# Patient Record
Sex: FEMALE | Race: BLACK OR AFRICAN AMERICAN | NOT HISPANIC OR LATINO | Employment: UNEMPLOYED | URBAN - METROPOLITAN AREA
[De-identification: names, ages, dates, MRNs, and addresses within clinical notes are randomized per-mention and may not be internally consistent; named-entity substitution may affect disease eponyms.]

---

## 2024-03-26 ENCOUNTER — OFFICE VISIT (OUTPATIENT)
Dept: URGENT CARE | Facility: CLINIC | Age: 19
End: 2024-03-26
Payer: COMMERCIAL

## 2024-03-26 VITALS
RESPIRATION RATE: 18 BRPM | TEMPERATURE: 96.2 F | OXYGEN SATURATION: 100 % | DIASTOLIC BLOOD PRESSURE: 60 MMHG | HEART RATE: 75 BPM | HEIGHT: 61 IN | SYSTOLIC BLOOD PRESSURE: 102 MMHG | WEIGHT: 106 LBS | BODY MASS INDEX: 20.01 KG/M2

## 2024-03-26 DIAGNOSIS — R42 DIZZINESS AND GIDDINESS: ICD-10-CM

## 2024-03-26 DIAGNOSIS — R52 BODY ACHES: Primary | ICD-10-CM

## 2024-03-26 PROCEDURE — 87636 SARSCOV2 & INF A&B AMP PRB: CPT | Performed by: PHYSICIAN ASSISTANT

## 2024-03-26 PROCEDURE — 99213 OFFICE O/P EST LOW 20 MIN: CPT | Performed by: PHYSICIAN ASSISTANT

## 2024-03-26 NOTE — PROGRESS NOTES
Assessment/Plan    Body aches [R52]  1. Body aches  Covid19 and INFLUENZA A/B PCR      2. Dizziness and giddiness          Possible viral illness.  Recommending patient go to the emergency room if she develops worsening symptoms or symptoms do not improve by tomorrow.     You were swabbed today for COVID/flu.  If positive you will be called by someone in the office.  Your symptoms may be due to a viral illness such as the flu.  You need to be evaluated by a PCP within the next week.  If your symptoms worsen or do not improve by tomorrow morning you need to be evaluated in the emergency room. The emergency room will be able to do a full workup.   Make sure to rest while off work tomorrow and stay hydrated. Your symptoms may also be from dehydration.     Subjective:     Patient ID: Xiomara Sadler is a 18 y.o. female.      Reason For Visit / Chief Complaint  Chief Complaint   Patient presents with    Fatigue    Dizziness     Was in a hot kitchen and felt dizzy and weak with L ear pain and occ. SOB and nausea. No HA, fever or URI s/s. Has PMH of anemia.          Xiomara is a 18 year old female presenting to the urgent care today with fatigue and dizziness starting today. Pt states she was working in a hot kitchen and felt dizzy and weak. She does not report LOC or recent head trauma. After the incident, she was able to sit down which helped relieve her symptoms. She has associated epigastric pain, L ear pain, SOB, nausea, headaches and slight abdominal discomfort. Denies fevers, URI symptoms, or changes in vision. She has not taken any medications today to help with her symptoms. The only meal she had today was lunch and she reports drinking a very minimal amount of water throughout the day. She notes a hx of anemia and previous stomach ulcers.       Past Medical History:   Diagnosis Date    Anemia     Asthma     Migraine        History reviewed. No pertinent surgical history.    History reviewed. No pertinent  "family history.    Review of Systems   Constitutional:  Negative for chills and fever.   HENT:  Positive for ear pain (L side). Negative for sore throat.    Eyes:  Negative for pain and visual disturbance.   Respiratory:  Negative for cough and shortness of breath.    Cardiovascular:  Negative for chest pain and palpitations.   Gastrointestinal:  Positive for abdominal pain (epigastric region) and nausea. Negative for vomiting.   Genitourinary:  Negative for dysuria and hematuria.   Musculoskeletal:  Negative for arthralgias and back pain.   Skin:  Negative for color change and rash.   Neurological:  Positive for dizziness, weakness, light-headedness and headaches. Negative for seizures and syncope.   All other systems reviewed and are negative.      Objective:    /60   Pulse 75   Temp (!) 96.2 °F (35.7 °C)   Resp 18   Ht 5' 0.5\" (1.537 m)   Wt 48.1 kg (106 lb)   LMP 03/05/2024 (Approximate)   SpO2 100%   BMI 20.36 kg/m²     Physical Exam  Constitutional:       General: She is not in acute distress.     Appearance: Normal appearance. She is normal weight. She is not ill-appearing, toxic-appearing or diaphoretic.   HENT:      Head: Normocephalic and atraumatic.      Right Ear: Tympanic membrane, ear canal and external ear normal. There is no impacted cerumen.      Left Ear: Tympanic membrane, ear canal and external ear normal. There is no impacted cerumen.      Nose: Nose normal. No congestion or rhinorrhea.      Mouth/Throat:      Mouth: Mucous membranes are moist.      Pharynx: Oropharynx is clear. No oropharyngeal exudate or posterior oropharyngeal erythema.   Eyes:      General: No scleral icterus.        Right eye: No discharge.         Left eye: No discharge.      Extraocular Movements: Extraocular movements intact.      Conjunctiva/sclera: Conjunctivae normal.      Pupils: Pupils are equal, round, and reactive to light.   Cardiovascular:      Rate and Rhythm: Normal rate and regular rhythm.      " Heart sounds: No murmur heard.     No friction rub. No gallop.   Pulmonary:      Effort: Pulmonary effort is normal. No respiratory distress.      Breath sounds: Normal breath sounds. No stridor. No wheezing, rhonchi or rales.   Chest:      Chest wall: No tenderness.   Abdominal:      General: Abdomen is flat. Bowel sounds are normal. There is no distension.      Palpations: Abdomen is soft. There is no mass.      Tenderness: There is no abdominal tenderness. There is no right CVA tenderness, left CVA tenderness, guarding or rebound.      Hernia: No hernia is present.   Musculoskeletal:         General: Normal range of motion.   Skin:     General: Skin is warm.      Capillary Refill: Capillary refill takes less than 2 seconds.   Neurological:      General: No focal deficit present.      Mental Status: She is alert and oriented to person, place, and time. Mental status is at baseline.      Cranial Nerves: No cranial nerve deficit.      Sensory: No sensory deficit.      Motor: No weakness.      Coordination: Coordination normal.      Gait: Gait normal.      Deep Tendon Reflexes: Reflexes normal.

## 2024-03-26 NOTE — PATIENT INSTRUCTIONS
You were swabbed today for COVID/flu.  If positive you will be called by someone in the office.  Your symptoms may be due to a viral illness such as the flu.  You need to be evaluated by a PCP within the next week.  If your symptoms worsen or do not improve by tomorrow morning you need to be evaluated in the emergency room. The emergency room will be able to do a full workup.   Make sure to rest while off work tomorrow and stay hydrated. Your symptoms may also be from dehydration.         Dizziness   WHAT YOU NEED TO KNOW:   Dizziness is a feeling of being off balance or unsteady. Common causes of dizziness are an inner ear fluid imbalance or a lack of oxygen in your blood. Dizziness may be acute (lasts 3 days or less) or chronic (lasts longer than 3 days). You may have dizzy spells that last from seconds to a few hours.   DISCHARGE INSTRUCTIONS:   Return to the emergency department if:   You have a headache and a stiff neck.    You have shaking chills and a fever.     You vomit over and over with no relief.     Your vomit or bowel movements are red or black.     You have pain in your chest, back, or abdomen.     You have numbness, especially in your face, arms, or legs.     You have trouble moving your arms or legs.     You are confused.    Contact your healthcare provider if:   You have a fever.     Your symptoms do not get better with treatment.     You have questions or concerns about your condition or care.    Manage your symptoms:   Do not drive  or operate heavy machinery when you are dizzy.     Get up slowly  from sitting or lying down.     Drink plenty of liquids.  Liquids help prevent dehydration. Ask how much liquid to drink each day and which liquids are best for you.    Follow up with your doctor as directed:  Write down your questions so you remember to ask them during your visits.  © Copyright [employer] 2023 Information is for End User's use only and may not be sold, redistributed or otherwise used for  commercial purposes.  The above information is an  only. It is not intended as medical advice for individual conditions or treatments. Talk to your doctor, nurse or pharmacist before following any medical regimen to see if it is safe and effective for you.

## 2024-03-27 ENCOUNTER — HOSPITAL ENCOUNTER (EMERGENCY)
Facility: HOSPITAL | Age: 19
Discharge: HOME | End: 2024-03-27
Attending: EMERGENCY MEDICINE
Payer: COMMERCIAL

## 2024-03-27 ENCOUNTER — APPOINTMENT (EMERGENCY)
Dept: RADIOLOGY | Facility: HOSPITAL | Age: 19
End: 2024-03-27
Payer: COMMERCIAL

## 2024-03-27 VITALS
OXYGEN SATURATION: 100 % | SYSTOLIC BLOOD PRESSURE: 106 MMHG | RESPIRATION RATE: 20 BRPM | TEMPERATURE: 97 F | DIASTOLIC BLOOD PRESSURE: 65 MMHG | HEART RATE: 82 BPM

## 2024-03-27 DIAGNOSIS — R07.89 CHEST PRESSURE: ICD-10-CM

## 2024-03-27 DIAGNOSIS — M94.0 COSTOCHONDRITIS: Primary | ICD-10-CM

## 2024-03-27 LAB
ALBUMIN SERPL BCP-MCNC: 4.4 G/DL (ref 3.5–5)
ALP SERPL-CCNC: 45 U/L (ref 34–104)
ALT SERPL W P-5'-P-CCNC: 15 U/L (ref 7–52)
ANION GAP SERPL CALCULATED.3IONS-SCNC: 7 MMOL/L (ref 4–13)
AST SERPL W P-5'-P-CCNC: 21 U/L (ref 13–39)
BASOPHILS # BLD AUTO: 0.02 THOUSANDS/ÂΜL (ref 0–0.1)
BASOPHILS NFR BLD AUTO: 0 % (ref 0–1)
BILIRUB SERPL-MCNC: 0.49 MG/DL (ref 0.2–1)
BUN SERPL-MCNC: 11 MG/DL (ref 5–25)
CALCIUM SERPL-MCNC: 9.7 MG/DL (ref 8.4–10.2)
CARDIAC TROPONIN I PNL SERPL HS: <2 NG/L
CHLORIDE SERPL-SCNC: 105 MMOL/L (ref 96–108)
CO2 SERPL-SCNC: 25 MMOL/L (ref 21–32)
CREAT SERPL-MCNC: 0.73 MG/DL (ref 0.6–1.3)
EOSINOPHIL # BLD AUTO: 0.11 THOUSAND/ÂΜL (ref 0–0.61)
EOSINOPHIL NFR BLD AUTO: 2 % (ref 0–6)
ERYTHROCYTE [DISTWIDTH] IN BLOOD BY AUTOMATED COUNT: 12.4 % (ref 11.6–15.1)
EXT PREGNANCY TEST URINE: NEGATIVE
EXT. CONTROL: NORMAL
FLUAV RNA RESP QL NAA+PROBE: NEGATIVE
FLUBV RNA RESP QL NAA+PROBE: NEGATIVE
GFR SERPL CREATININE-BSD FRML MDRD: 118 ML/MIN/1.73SQ M
GLUCOSE SERPL-MCNC: 78 MG/DL (ref 65–140)
GLUCOSE SERPL-MCNC: 84 MG/DL (ref 65–140)
HCT VFR BLD AUTO: 41.5 % (ref 34.8–46.1)
HGB BLD-MCNC: 13.6 G/DL (ref 11.5–15.4)
IMM GRANULOCYTES # BLD AUTO: 0 THOUSAND/UL (ref 0–0.2)
IMM GRANULOCYTES NFR BLD AUTO: 0 % (ref 0–2)
LIPASE SERPL-CCNC: <6 U/L (ref 11–82)
LYMPHOCYTES # BLD AUTO: 2.42 THOUSANDS/ÂΜL (ref 0.6–4.47)
LYMPHOCYTES NFR BLD AUTO: 41 % (ref 14–44)
MCH RBC QN AUTO: 30.6 PG (ref 26.8–34.3)
MCHC RBC AUTO-ENTMCNC: 32.8 G/DL (ref 31.4–37.4)
MCV RBC AUTO: 93 FL (ref 82–98)
MONOCYTES # BLD AUTO: 0.51 THOUSAND/ÂΜL (ref 0.17–1.22)
MONOCYTES NFR BLD AUTO: 9 % (ref 4–12)
NEUTROPHILS # BLD AUTO: 2.85 THOUSANDS/ÂΜL (ref 1.85–7.62)
NEUTS SEG NFR BLD AUTO: 48 % (ref 43–75)
NRBC BLD AUTO-RTO: 0 /100 WBCS
PLATELET # BLD AUTO: 197 THOUSANDS/UL (ref 149–390)
PMV BLD AUTO: 11.3 FL (ref 8.9–12.7)
POTASSIUM SERPL-SCNC: 4 MMOL/L (ref 3.5–5.3)
PROT SERPL-MCNC: 7.5 G/DL (ref 6.4–8.4)
RBC # BLD AUTO: 4.45 MILLION/UL (ref 3.81–5.12)
SARS-COV-2 RNA RESP QL NAA+PROBE: NEGATIVE
SODIUM SERPL-SCNC: 137 MMOL/L (ref 135–147)
WBC # BLD AUTO: 5.91 THOUSAND/UL (ref 4.31–10.16)

## 2024-03-27 PROCEDURE — 84484 ASSAY OF TROPONIN QUANT: CPT | Performed by: EMERGENCY MEDICINE

## 2024-03-27 PROCEDURE — 85025 COMPLETE CBC W/AUTO DIFF WBC: CPT | Performed by: EMERGENCY MEDICINE

## 2024-03-27 PROCEDURE — 71046 X-RAY EXAM CHEST 2 VIEWS: CPT

## 2024-03-27 PROCEDURE — 83690 ASSAY OF LIPASE: CPT | Performed by: EMERGENCY MEDICINE

## 2024-03-27 PROCEDURE — 96374 THER/PROPH/DIAG INJ IV PUSH: CPT

## 2024-03-27 PROCEDURE — 99285 EMERGENCY DEPT VISIT HI MDM: CPT

## 2024-03-27 PROCEDURE — 96361 HYDRATE IV INFUSION ADD-ON: CPT

## 2024-03-27 PROCEDURE — 36415 COLL VENOUS BLD VENIPUNCTURE: CPT | Performed by: EMERGENCY MEDICINE

## 2024-03-27 PROCEDURE — 82948 REAGENT STRIP/BLOOD GLUCOSE: CPT

## 2024-03-27 PROCEDURE — 81025 URINE PREGNANCY TEST: CPT | Performed by: EMERGENCY MEDICINE

## 2024-03-27 PROCEDURE — 80053 COMPREHEN METABOLIC PANEL: CPT | Performed by: EMERGENCY MEDICINE

## 2024-03-27 PROCEDURE — 93005 ELECTROCARDIOGRAM TRACING: CPT

## 2024-03-27 PROCEDURE — 99285 EMERGENCY DEPT VISIT HI MDM: CPT | Performed by: EMERGENCY MEDICINE

## 2024-03-27 RX ORDER — KETOROLAC TROMETHAMINE 30 MG/ML
15 INJECTION, SOLUTION INTRAMUSCULAR; INTRAVENOUS ONCE
Status: COMPLETED | OUTPATIENT
Start: 2024-03-27 | End: 2024-03-27

## 2024-03-27 RX ADMIN — SODIUM CHLORIDE 1000 ML: 0.9 INJECTION, SOLUTION INTRAVENOUS at 13:45

## 2024-03-27 RX ADMIN — KETOROLAC TROMETHAMINE 15 MG: 30 INJECTION, SOLUTION INTRAMUSCULAR; INTRAVENOUS at 13:52

## 2024-03-27 NOTE — ED PROVIDER NOTES
History  Chief Complaint   Patient presents with    Chest Pain     Chest discomfort dizziness and weakness since yesterday, did  go to urgent care yesterday, swabbed neg for covid and flu. Went to another urgent care today that did an EKG, was told it was abnormal     Pt is a 17yo F who presents for chest pain.  Patient's aunt reports that for the past several months she intermittently complains of a lower chest pain.  Aunt states that she thought it was ulcers or gastritis and was therefore giving her Tums and having her adjust her diet.  She was doing well with those changes.  Yesterday, however, patient was at work when she began to feel chest pressure, shortness of breath, lightheadedness, and near syncope.  Patient did not pass out.  Patient reports she felt nauseous but did not vomit.  Patient reports that she was seen at urgent care at that time.  They did a viral swab that was negative and she was told to go to the emergency department with worsening or not resolving symptoms.  Patient states she has not had any further episodes of the lightheadedness or the near syncope but has still felt the chest pain.  She describes it as a pressure.  Aunt encouraged patient to go to a different urgent care this morning and patient had an EKG done.  Patient reported to triage nurse that the EKG was abnormal and she was recommended to come to the ED.  Aunt also reports that patient has history of headaches.  Patient reports her headache is currently a 2 out of 10.  She reports her chest pain is currently a 6 out of 10.  She reports her chest pain is not pleuritic.  Patient has not had any recent viral illnesses, however 2 to 3 weeks ago multiple people in her household had the flu.  Patient has remote history of asthma but does not take any daily medications.  Patient is otherwise healthy.    Pt is Japanese Creole speaking only and opted for aunt to translate.          Prior to Admission Medications   Prescriptions Last  Dose Informant Patient Reported? Taking?   UNKNOWN TO PATIENT Not Taking  Yes No   Sig: daily as needed Medication for migraines - unknown medication   Patient not taking: Reported on 3/27/2024      Facility-Administered Medications: None       Past Medical History:   Diagnosis Date    Anemia     Asthma     Migraine        History reviewed. No pertinent surgical history.    History reviewed. No pertinent family history.  I have reviewed and agree with the history as documented.    E-Cigarette/Vaping    E-Cigarette Use Never User      E-Cigarette/Vaping Substances     Social History     Tobacco Use    Smoking status: Never   Vaping Use    Vaping status: Never Used   Substance Use Topics    Alcohol use: Never    Drug use: Never       Review of Systems   Respiratory:  Positive for shortness of breath.    Cardiovascular:  Positive for chest pain.   Gastrointestinal:  Positive for nausea (resolved).   Neurological:  Positive for syncope (near syncope yesterday) and headaches (chronic; mild currently).       Physical Exam  Physical Exam  Vitals reviewed.   Constitutional:       General: She is not in acute distress.     Appearance: She is well-developed. She is not toxic-appearing or diaphoretic.   HENT:      Head: Normocephalic and atraumatic.      Right Ear: External ear normal.      Left Ear: External ear normal.      Nose: Nose normal.      Mouth/Throat:      Pharynx: Oropharynx is clear.   Eyes:      Extraocular Movements: Extraocular movements intact.      Pupils: Pupils are equal, round, and reactive to light.   Cardiovascular:      Rate and Rhythm: Normal rate and regular rhythm.      Heart sounds: Normal heart sounds.   Pulmonary:      Effort: Pulmonary effort is normal. No respiratory distress.      Breath sounds: Normal breath sounds. No wheezing.   Chest:      Chest wall: Tenderness (bilateral along the costosternal border) present.   Abdominal:      General: Bowel sounds are normal. There is no distension.       Palpations: Abdomen is soft.      Tenderness: There is no abdominal tenderness.   Musculoskeletal:         General: Normal range of motion.      Cervical back: Normal range of motion and neck supple.      Right lower leg: No edema.      Left lower leg: No edema.   Skin:     General: Skin is warm and dry.      Capillary Refill: Capillary refill takes less than 2 seconds.      Coloration: Skin is not pale.      Findings: No erythema or rash.   Neurological:      General: No focal deficit present.      Mental Status: She is alert and oriented to person, place, and time.   Psychiatric:         Speech: Speech normal.         Behavior: Behavior is cooperative.         Vital Signs  ED Triage Vitals [03/27/24 1306]   Temperature Pulse Respirations Blood Pressure SpO2   (!) 97 °F (36.1 °C) 72 20 118/75 100 %      Temp Source Heart Rate Source Patient Position - Orthostatic VS BP Location FiO2 (%)   Tympanic Monitor Sitting Right arm --      Pain Score       6           Vitals:    03/27/24 1306 03/27/24 1345 03/27/24 1415 03/27/24 1500   BP: 118/75 126/89 121/81 106/65   Pulse: 72 66 62 82   Patient Position - Orthostatic VS: Sitting  Lying Sitting         Visual Acuity      ED Medications  Medications   sodium chloride 0.9 % bolus 1,000 mL (0 mL Intravenous Stopped 3/27/24 1517)   ketorolac (TORADOL) injection 15 mg (15 mg Intravenous Given 3/27/24 1352)       Diagnostic Studies  Results Reviewed       Procedure Component Value Units Date/Time    Comprehensive metabolic panel [[id number]] Collected: 03/27/24 1341    Lab Status: Final result Specimen: Blood from Arm, Left Updated: 03/27/24 1421     Sodium 137 mmol/L      Potassium 4.0 mmol/L      Chloride 105 mmol/L      CO2 25 mmol/L      ANION GAP 7 mmol/L      BUN 11 mg/dL      Creatinine 0.73 mg/dL      Glucose 84 mg/dL      Calcium 9.7 mg/dL      AST 21 U/L      ALT 15 U/L      Alkaline Phosphatase 45 U/L      Total Protein 7.5 g/dL      Albumin 4.4 g/dL      Total  Bilirubin 0.49 mg/dL      eGFR 118 ml/min/1.73sq m     Narrative:      National Kidney Disease Foundation guidelines for Chronic Kidney Disease (CKD):     Stage 1 with normal or high GFR (GFR > 90 mL/min/1.73 square meters)    Stage 2 Mild CKD (GFR = 60-89 mL/min/1.73 square meters)    Stage 3A Moderate CKD (GFR = 45-59 mL/min/1.73 square meters)    Stage 3B Moderate CKD (GFR = 30-44 mL/min/1.73 square meters)    Stage 4 Severe CKD (GFR = 15-29 mL/min/1.73 square meters)    Stage 5 End Stage CKD (GFR <15 mL/min/1.73 square meters)  Note: GFR calculation is accurate only with a steady state creatinine    Lipase [[id number]]  (Abnormal) Collected: 03/27/24 1341    Lab Status: Final result Specimen: Blood from Arm, Left Updated: 03/27/24 1421     Lipase <6 u/L     HS Troponin 0hr (reflex protocol) [[id number]]  (Normal) Collected: 03/27/24 1341    Lab Status: Final result Specimen: Blood from Arm, Left Updated: 03/27/24 1414     hs TnI 0hr <2 ng/L     CBC and differential [[id number]] Collected: 03/27/24 1341    Lab Status: Final result Specimen: Blood from Arm, Left Updated: 03/27/24 1353     WBC 5.91 Thousand/uL      RBC 4.45 Million/uL      Hemoglobin 13.6 g/dL      Hematocrit 41.5 %      MCV 93 fL      MCH 30.6 pg      MCHC 32.8 g/dL      RDW 12.4 %      MPV 11.3 fL      Platelets 197 Thousands/uL      nRBC 0 /100 WBCs      Neutrophils Relative 48 %      Immature Grans % 0 %      Lymphocytes Relative 41 %      Monocytes Relative 9 %      Eosinophils Relative 2 %      Basophils Relative 0 %      Neutrophils Absolute 2.85 Thousands/µL      Absolute Immature Grans 0.00 Thousand/uL      Absolute Lymphocytes 2.42 Thousands/µL      Absolute Monocytes 0.51 Thousand/µL      Eosinophils Absolute 0.11 Thousand/µL      Basophils Absolute 0.02 Thousands/µL     POCT pregnancy, urine [[id number]]  (Normal) Resulted: 03/27/24 1346    Lab Status: Final result Updated: 03/27/24 1347     EXT Preg Test, Ur Negative     Control Valid     Fingerstick Glucose (POCT) [[id number]]  (Normal) Collected: 03/27/24 1335    Lab Status: Final result Specimen: Blood Updated: 03/27/24 1336     POC Glucose 78 mg/dl                    XR chest 2 views    (Results Pending)              Procedures  Procedures         ED Course  ED Course as of 03/27/24 1525   Wed Mar 27, 2024   1321 Procedure Note: EKG  Date/Time: 03/27/24 2:33 PM   Interpreted by: Bonnie Sharma MD  Indications / Diagnosis: CP  ECG reviewed by me, the ED Physician: yes   The EKG demonstrates:  Rhythm: normal sinus  Intervals: normal intervals  Axis: normal axis  QRS/Blocks: normal QRS  ST Changes: No acute ST Changes, no STD/NICO.   1336 POC Glucose: 78  WNL   1350 PREGNANCY TEST URINE: Negative  Negative.    1355 CBC and differential  Reviewed and without actionable derangement.    1414 hs TnI 0hr: <2  WNL. Does not require delta based on timing or level. Effectively rules out ACS and myocarditis.   1432 Comprehensive metabolic panel  Reviewed and without actionable derangement.    1432 LIPASE(!): <6  Negative.    1432 XR chest 2 views  No acute consolidation, effusion, or pneumothorax on wet read.    1513 Pt reassessed and resting comfortably. VSS. Pt made aware of all results via  as aunt no longer at bedside. Pt expressed understanding and is agreeable to plan.              HEART Risk Score      Flowsheet Row Most Recent Value   Heart Score Risk Calculator    History 0 Filed at: 03/27/2024 1506   ECG 0 Filed at: 03/27/2024 1506   Age 0 Filed at: 03/27/2024 1506   Risk Factors 0 Filed at: 03/27/2024 1506   Troponin 0 Filed at: 03/27/2024 1506   HEART Score 0 Filed at: 03/27/2024 1506                  PERC Rule for PE      Flowsheet Row Most Recent Value   PERC Rule for PE    Age >=50 0 Filed at: 03/27/2024 1330   HR >=100 0 Filed at: 03/27/2024 1330   O2 Sat on room air < 95% 0 Filed at: 03/27/2024 1330   History of PE or DVT 0 Filed at: 03/27/2024 1330   Recent trauma or surgery 0  Filed at: 03/27/2024 1330   Hemoptysis 0 Filed at: 03/27/2024 1330   Exogenous estrogen 0 Filed at: 03/27/2024 1330   Unilateral leg swelling 0 Filed at: 03/27/2024 1330   PERC Rule for PE Results 0 Filed at: 03/27/2024 1330                    Wells' Criteria for PE      Flowsheet Row Most Recent Value   Wells' Criteria for PE    Clinical signs and symptoms of DVT 0 Filed at: 03/27/2024 1330   PE is primary diagnosis or equally likely 0 Filed at: 03/27/2024 1330   HR >100 0 Filed at: 03/27/2024 1330   Immobilization at least 3 days or Surgery in the previous 4 weeks 0 Filed at: 03/27/2024 1330   Previous, objectively diagnosed PE or DVT 0 Filed at: 03/27/2024 1330   Hemoptysis 0 Filed at: 03/27/2024 1330   Malignancy with treatment within 6 months or palliative 0 Filed at: 03/27/2024 1330   Wells' Criteria Total 0 Filed at: 03/27/2024 1330                  Medical Decision Making  Pt is a 17yo F who presents with chest pain. Exam pertinent for costosternal tenderness.    Differential diagnosis to include but not limited to costochondritis, myocarditis, pericarditis, arrhythmia, anemia, electrolyte abnormality, pregnancy, dehydration, PE.  Will plan for labs, chest x-ray, and EKG.  Will treat symptomatically and reassess.  Patient is Wells low and PERCs out and therefore does not require further w/u for PE, see ED course for results and details.    Plan to discharge pt with f/u to PCP. Discussed returning the ED with new or worsening of symptoms. Discussed use of over the counter medications as stated on the bottle as needed for pain. Pt expressed understanding of discharge instructions, return precautions, and medication instructions and is stable for discharge at this time. All questions were answered and pt was discharged without incident.       Amount and/or Complexity of Data Reviewed  Labs: ordered. Decision-making details documented in ED Course.  Radiology: ordered. Decision-making details documented in ED  Course.    Risk  Prescription drug management.             Disposition  Final diagnoses:   Chest pressure   Costochondritis     Time reflects when diagnosis was documented in both MDM as applicable and the Disposition within this note       Time User Action Codes Description Comment    3/27/2024  3:06 PM Bonnie Sharma Add [R07.89] Chest pressure     3/27/2024  3:06 PM Bonnie Sharma Add [M94.0] Costochondritis     3/27/2024  3:07 PM Bonnie Sharma Modify [R07.89] Chest pressure     3/27/2024  3:07 PM Bonnie Sharma Modify [M94.0] Costochondritis           ED Disposition       ED Disposition   Discharge    Condition   Stable    Date/Time   Wed Mar 27, 2024 1452    Comment   Xiomara Sadler discharge to home/self care.                   Follow-up Information       Follow up With Specialties Details Why Contact Info Additional Information    [facility]  Call  As needed [phone]       [facility] Call  As needed [address] [facility] [address] [phone] [facility], [address] [place], [place], [address] [phone]            Patient's Medications   Discharge Prescriptions    No medications on file       No discharge procedures on file.    PDMP Review       None            ED Provider  Electronically Signed by             Bonnei Sharma MD  03/27/24 1487

## 2024-03-27 NOTE — DISCHARGE INSTRUCTIONS
Follow-up with primary care for further care. Contact info provided below if needed.  Contact info also provided for neurology if you would like to follow up for your headaches.   Use over the counter medications as stated on the bottle as needed for pain control.  Stay hydrated.   Return to the ED with new or worsening symptoms.

## 2024-03-28 LAB
ATRIAL RATE: 62 BPM
P AXIS: 72 DEGREES
PR INTERVAL: 140 MS
QRS AXIS: 62 DEGREES
QRSD INTERVAL: 70 MS
QT INTERVAL: 368 MS
QTC INTERVAL: 373 MS
T WAVE AXIS: 39 DEGREES
VENTRICULAR RATE: 62 BPM

## 2024-03-28 PROCEDURE — 93010 ELECTROCARDIOGRAM REPORT: CPT | Performed by: INTERNAL MEDICINE

## 2024-05-13 ENCOUNTER — HOSPITAL ENCOUNTER (EMERGENCY)
Facility: HOSPITAL | Age: 19
Discharge: HOME | End: 2024-05-13
Attending: STUDENT IN AN ORGANIZED HEALTH CARE EDUCATION/TRAINING PROGRAM
Payer: COMMERCIAL

## 2024-05-13 VITALS
DIASTOLIC BLOOD PRESSURE: 78 MMHG | WEIGHT: 103.8 LBS | RESPIRATION RATE: 18 BRPM | HEART RATE: 74 BPM | TEMPERATURE: 97.9 F | BODY MASS INDEX: 19.94 KG/M2 | SYSTOLIC BLOOD PRESSURE: 109 MMHG | OXYGEN SATURATION: 99 %

## 2024-05-13 DIAGNOSIS — K08.89 PAIN, DENTAL: ICD-10-CM

## 2024-05-13 DIAGNOSIS — H92.02 EAR PAIN, LEFT: Primary | ICD-10-CM

## 2024-05-13 PROCEDURE — 99284 EMERGENCY DEPT VISIT MOD MDM: CPT | Performed by: STUDENT IN AN ORGANIZED HEALTH CARE EDUCATION/TRAINING PROGRAM

## 2024-05-13 PROCEDURE — 99282 EMERGENCY DEPT VISIT SF MDM: CPT

## 2024-05-13 RX ORDER — CIPROFLOXACIN AND DEXAMETHASONE 3; 1 MG/ML; MG/ML
4 SUSPENSION/ DROPS AURICULAR (OTIC) 2 TIMES DAILY
Qty: 3 ML | Refills: 0 | Status: SHIPPED | OUTPATIENT
Start: 2024-05-13 | End: 2024-05-20

## 2024-05-13 NOTE — ED PROVIDER NOTES
"History  Chief Complaint   Patient presents with    Ear Problem     States \" feels like something is walking in my ear \". Mild pain left ear     Dental Pain     Shows broken left upper molar . No dentist. Pain if eats \" too sweet \" food      Patient is a 19-year-old female, past medical history including asthma, who presents to the emergency room for left upper dental pain as well as left ear pain.  Both problems have been present for years.  She states that it feels like there is something crawling in her left ear and it is causing her pain.  The dental pain is really only when she eats cold foods.  She states it is at the area of a broken tooth that has been present for a while.  Has not seen a dentist, nor an ENT doctor.  She has no other complaints or concerns.        Prior to Admission Medications   Prescriptions Last Dose Informant Patient Reported? Taking?   UNKNOWN TO PATIENT   Yes No   Sig: daily as needed Medication for migraines - unknown medication   Patient not taking: Reported on 3/27/2024      Facility-Administered Medications: None       Past Medical History:   Diagnosis Date    Anemia     Asthma     Migraine        History reviewed. No pertinent surgical history.    History reviewed. No pertinent family history.  I have reviewed and agree with the history as documented.    E-Cigarette/Vaping    E-Cigarette Use Never User      E-Cigarette/Vaping Substances     Social History     Tobacco Use    Smoking status: Never    Smokeless tobacco: Never   Vaping Use    Vaping status: Never Used   Substance Use Topics    Alcohol use: Never    Drug use: Never       Review of Systems   HENT:  Positive for dental problem and ear pain.    All other systems reviewed and are negative.      Physical Exam  Physical Exam  Vitals and nursing note reviewed.   Constitutional:       General: She is not in acute distress.     Appearance: She is well-developed. She is not ill-appearing, toxic-appearing or diaphoretic.   HENT: "      Head: Normocephalic and atraumatic.      Right Ear: Tympanic membrane, ear canal and external ear normal.      Left Ear: Tympanic membrane and external ear normal.      Ears:      Comments: Mild erythema of the left external auditory canal.  No foreign bodies.  TM is unremarkable.     Nose: Nose normal.      Mouth/Throat:     Eyes:      General: Lids are normal. No scleral icterus.  Cardiovascular:      Rate and Rhythm: Normal rate and regular rhythm.   Pulmonary:      Effort: Pulmonary effort is normal. No respiratory distress.   Musculoskeletal:         General: No deformity. Normal range of motion.      Cervical back: Normal range of motion and neck supple.   Skin:     General: Skin is warm and dry.   Neurological:      General: No focal deficit present.      Mental Status: She is alert.   Psychiatric:         Mood and Affect: Mood normal.         Behavior: Behavior normal.         Vital Signs  ED Triage Vitals [05/13/24 1402]   Temperature Pulse Respirations Blood Pressure SpO2   97.9 °F (36.6 °C) 74 18 109/78 99 %      Temp Source Heart Rate Source Patient Position - Orthostatic VS BP Location FiO2 (%)   Tympanic Monitor Sitting Left arm --      Pain Score       3           Vitals:    05/13/24 1402   BP: 109/78   Pulse: 74   Patient Position - Orthostatic VS: Sitting         Visual Acuity      ED Medications  Medications - No data to display    Diagnostic Studies  Results Reviewed       None                   No orders to display              Procedures  Procedures         ED Course                               SBIRT 20yo+      Flowsheet Row Most Recent Value   Initial Alcohol Screen: US AUDIT-C     1. How often do you have a drink containing alcohol? 0 Filed at: 05/13/2024 1405   Audit-C Score 0 Filed at: 05/13/2024 1405   RADHA: How many times in the past year have you...    Used an illegal drug or used a prescription medication for non-medical reasons? Never Filed at: 05/13/2024 1402       "                Medical Decision Making  Patient is a 19 y.o. female who presents to the ED for left ear pain, dental pain. Pt is non-toxic, well appearing. Vitals are stable. On exam there is a fractured tooth and mild left EAC erythema.     Differential includes but is not limited to: Irritation, mild otitis externa.     Cold sensitivity likely secondary to fractured tooth.  Presentation not consistent with abscess, ludwigs, pta, rpa.     Plan: reassurance, abx ear drops, d/c with ENT and dental f/u                 Portions of the record may have been created with voice recognition software. Occasional wrong word or \"sound a like\" substitutions may have occurred due to the inherent limitations of voice recognition software. Read the chart carefully and recognize, using context, where substitutions have occurred.    Problems Addressed:  Ear pain, left: acute illness or injury  Pain, dental: acute illness or injury    Risk  Prescription drug management.             Disposition  Final diagnoses:   Ear pain, left   Pain, dental     Time reflects when diagnosis was documented in both MDM as applicable and the Disposition within this note       Time User Action Codes Description Comment    5/13/2024  2:28 PM Florentin Ponce Add [H92.02] Ear pain, left     5/13/2024  2:28 PM Florentin Ponce Add [K08.89] Pain, dental           ED Disposition       ED Disposition   Discharge    Condition   Stable    Date/Time   Mon May 13, 2024 1428    Comment   Xiomara Sadler discharge to home/self care.                   Follow-up Information       Follow up With Specialties Details Why Contact Info Additional Information    Infolink  Call in 1 day  [phone]       [facility] Emergency Medicine   [address] [place] [id number] [phone] [facility]ly [facility] Emergency Department, [address] [place] [address], [address]    [facility]  " Schedule an appointment as soon as possible for a visit   [address]  [facility] [address]  [phone]     [facility]   [address] [place] [address] [phone] [facility] [place], [address] [place] [address], [facility], [place], [address], [phone]            Discharge Medication List as of 5/13/2024  2:30 PM        START taking these medications    Details   ciprofloxacin-dexamethasone (CIPRODEX) otic suspension Administer 4 drops into the left ear 2 (two) times a day for 7 days, Starting Mon 5/13/2024, Until Mon 5/20/2024, Normal           CONTINUE these medications which have NOT CHANGED    Details   UNKNOWN TO PATIENT daily as needed Medication for migraines - unknown medication, Historical Med             No discharge procedures on file.    PDMP Review       None            ED Provider  Electronically Signed by             Florentin Ponce DO  05/13/24 6321

## 2024-09-09 ENCOUNTER — HOSPITAL ENCOUNTER (EMERGENCY)
Facility: HOSPITAL | Age: 19
Discharge: HOME | End: 2024-09-09
Attending: STUDENT IN AN ORGANIZED HEALTH CARE EDUCATION/TRAINING PROGRAM | Admitting: STUDENT IN AN ORGANIZED HEALTH CARE EDUCATION/TRAINING PROGRAM
Payer: COMMERCIAL

## 2024-09-09 ENCOUNTER — APPOINTMENT (EMERGENCY)
Dept: RADIOLOGY | Facility: HOSPITAL | Age: 19
End: 2024-09-09
Payer: COMMERCIAL

## 2024-09-09 VITALS
HEIGHT: 60 IN | DIASTOLIC BLOOD PRESSURE: 65 MMHG | HEART RATE: 82 BPM | RESPIRATION RATE: 16 BRPM | WEIGHT: 103 LBS | BODY MASS INDEX: 20.22 KG/M2 | SYSTOLIC BLOOD PRESSURE: 107 MMHG | TEMPERATURE: 98.8 F | OXYGEN SATURATION: 95 %

## 2024-09-09 DIAGNOSIS — R55 SYNCOPE: Primary | ICD-10-CM

## 2024-09-09 LAB
2HR DELTA HS TROPONIN: -4 NG/L
ALBUMIN SERPL BCG-MCNC: 5.2 G/DL (ref 3.5–5)
ALP SERPL-CCNC: 55 U/L (ref 34–104)
ALT SERPL W P-5'-P-CCNC: 18 U/L (ref 7–52)
ANION GAP SERPL CALCULATED.3IONS-SCNC: 12 MMOL/L (ref 4–13)
AST SERPL W P-5'-P-CCNC: 25 U/L (ref 13–39)
BASOPHILS # BLD AUTO: 0.04 THOUSANDS/ÂΜL (ref 0–0.1)
BASOPHILS NFR BLD AUTO: 0 % (ref 0–1)
BILIRUB SERPL-MCNC: 0.73 MG/DL (ref 0.2–1)
BUN SERPL-MCNC: 11 MG/DL (ref 5–25)
CALCIUM SERPL-MCNC: 10.2 MG/DL (ref 8.4–10.2)
CARDIAC TROPONIN I PNL SERPL HS: 11 NG/L
CARDIAC TROPONIN I PNL SERPL HS: 7 NG/L
CHLORIDE SERPL-SCNC: 100 MMOL/L (ref 96–108)
CO2 SERPL-SCNC: 27 MMOL/L (ref 21–32)
CREAT SERPL-MCNC: 0.7 MG/DL (ref 0.6–1.3)
D DIMER PPP FEU-MCNC: 0.4 UG/ML FEU
EOSINOPHIL # BLD AUTO: 0.04 THOUSAND/ÂΜL (ref 0–0.61)
EOSINOPHIL NFR BLD AUTO: 0 % (ref 0–6)
ERYTHROCYTE [DISTWIDTH] IN BLOOD BY AUTOMATED COUNT: 12.4 % (ref 11.6–15.1)
GFR SERPL CREATININE-BSD FRML MDRD: 124 ML/MIN/1.73SQ M
GLUCOSE SERPL-MCNC: 69 MG/DL (ref 65–140)
HCG SERPL QL: NEGATIVE
HCT VFR BLD AUTO: 44.2 % (ref 34.8–46.1)
HGB BLD-MCNC: 14.5 G/DL (ref 11.5–15.4)
IMM GRANULOCYTES # BLD AUTO: 0.01 THOUSAND/UL (ref 0–0.2)
IMM GRANULOCYTES NFR BLD AUTO: 0 % (ref 0–2)
LYMPHOCYTES # BLD AUTO: 1.95 THOUSANDS/ÂΜL (ref 0.6–4.47)
LYMPHOCYTES NFR BLD AUTO: 19 % (ref 14–44)
MCH RBC QN AUTO: 30.9 PG (ref 26.8–34.3)
MCHC RBC AUTO-ENTMCNC: 32.8 G/DL (ref 31.4–37.4)
MCV RBC AUTO: 94 FL (ref 82–98)
MONOCYTES # BLD AUTO: 0.66 THOUSAND/ÂΜL (ref 0.17–1.22)
MONOCYTES NFR BLD AUTO: 6 % (ref 4–12)
NEUTROPHILS # BLD AUTO: 7.54 THOUSANDS/ÂΜL (ref 1.85–7.62)
NEUTS SEG NFR BLD AUTO: 75 % (ref 43–75)
NRBC BLD AUTO-RTO: 0 /100 WBCS
PLATELET # BLD AUTO: 246 THOUSANDS/UL (ref 149–390)
PMV BLD AUTO: 11.1 FL (ref 8.9–12.7)
POTASSIUM SERPL-SCNC: 3.5 MMOL/L (ref 3.5–5.3)
PROT SERPL-MCNC: 8 G/DL (ref 6.4–8.4)
RBC # BLD AUTO: 4.7 MILLION/UL (ref 3.81–5.12)
SODIUM SERPL-SCNC: 139 MMOL/L (ref 135–147)
WBC # BLD AUTO: 10.24 THOUSAND/UL (ref 4.31–10.16)

## 2024-09-09 PROCEDURE — 93005 ELECTROCARDIOGRAM TRACING: CPT

## 2024-09-09 PROCEDURE — 36415 COLL VENOUS BLD VENIPUNCTURE: CPT | Performed by: STUDENT IN AN ORGANIZED HEALTH CARE EDUCATION/TRAINING PROGRAM

## 2024-09-09 PROCEDURE — 71045 X-RAY EXAM CHEST 1 VIEW: CPT

## 2024-09-09 PROCEDURE — 85025 COMPLETE CBC W/AUTO DIFF WBC: CPT | Performed by: STUDENT IN AN ORGANIZED HEALTH CARE EDUCATION/TRAINING PROGRAM

## 2024-09-09 PROCEDURE — 84703 CHORIONIC GONADOTROPIN ASSAY: CPT | Performed by: STUDENT IN AN ORGANIZED HEALTH CARE EDUCATION/TRAINING PROGRAM

## 2024-09-09 PROCEDURE — 84484 ASSAY OF TROPONIN QUANT: CPT | Performed by: STUDENT IN AN ORGANIZED HEALTH CARE EDUCATION/TRAINING PROGRAM

## 2024-09-09 PROCEDURE — 80053 COMPREHEN METABOLIC PANEL: CPT | Performed by: STUDENT IN AN ORGANIZED HEALTH CARE EDUCATION/TRAINING PROGRAM

## 2024-09-09 PROCEDURE — 99284 EMERGENCY DEPT VISIT MOD MDM: CPT | Performed by: STUDENT IN AN ORGANIZED HEALTH CARE EDUCATION/TRAINING PROGRAM

## 2024-09-09 PROCEDURE — 96360 HYDRATION IV INFUSION INIT: CPT

## 2024-09-09 PROCEDURE — 99284 EMERGENCY DEPT VISIT MOD MDM: CPT

## 2024-09-09 PROCEDURE — 96361 HYDRATE IV INFUSION ADD-ON: CPT

## 2024-09-09 PROCEDURE — 85379 FIBRIN DEGRADATION QUANT: CPT | Performed by: STUDENT IN AN ORGANIZED HEALTH CARE EDUCATION/TRAINING PROGRAM

## 2024-09-09 RX ADMIN — SODIUM CHLORIDE 1000 ML: 0.9 INJECTION, SOLUTION INTRAVENOUS at 19:47

## 2024-09-10 NOTE — ED PROVIDER NOTES
History  Chief Complaint   Patient presents with    Syncope     Patient was walking to work when she stopped at a bank asking for help, then had a syncopal episode,is awake upon arrival     Patient is a 19-year-old female, past medical history including asthma, who presents the emergency room after syncopal episode.  Patient was walking at work when she began to feel lightheaded.  She went inside a bank and then passed out.  She was lowered to the ground and did not experience any trauma.  When she woke up she was back to normal.  She now presents for further evaluation.  No prior episodes of syncope.  No modifying factors.  No associated symptoms.       used: No (Patient declined)    Syncope      Prior to Admission Medications   Prescriptions Last Dose Informant Patient Reported? Taking?   UNKNOWN TO PATIENT   Yes No   Sig: daily as needed Medication for migraines - unknown medication   Patient not taking: Reported on 3/27/2024      Facility-Administered Medications: None       Past Medical History:   Diagnosis Date    Anemia     Asthma     Migraine        History reviewed. No pertinent surgical history.    History reviewed. No pertinent family history.  I have reviewed and agree with the history as documented.    E-Cigarette/Vaping    E-Cigarette Use Never User      E-Cigarette/Vaping Substances     Social History     Tobacco Use    Smoking status: Never    Smokeless tobacco: Never   Vaping Use    Vaping status: Never Used   Substance Use Topics    Alcohol use: Never    Drug use: Never       Review of Systems   Cardiovascular:  Positive for syncope.       Physical Exam  Physical Exam  Vitals and nursing note reviewed.   Constitutional:       General: She is not in acute distress.     Appearance: She is well-developed. She is not ill-appearing, toxic-appearing or diaphoretic.   HENT:      Head: Normocephalic and atraumatic.      Right Ear: External ear normal.      Left Ear: External ear normal.       Nose: Nose normal.   Eyes:      General: Lids are normal. No scleral icterus.  Cardiovascular:      Rate and Rhythm: Normal rate and regular rhythm.      Heart sounds: Normal heart sounds. No murmur heard.     No friction rub. No gallop.   Pulmonary:      Effort: Pulmonary effort is normal. No respiratory distress.      Breath sounds: Normal breath sounds. No wheezing or rales.   Abdominal:      Palpations: Abdomen is soft.      Tenderness: There is no abdominal tenderness. There is no guarding or rebound.   Musculoskeletal:         General: No deformity. Normal range of motion.      Cervical back: Normal range of motion and neck supple.   Skin:     General: Skin is warm and dry.   Neurological:      General: No focal deficit present.      Mental Status: She is alert.   Psychiatric:         Mood and Affect: Mood normal.         Behavior: Behavior normal.         Vital Signs  ED Triage Vitals [09/09/24 1533]   Temperature Pulse Respirations Blood Pressure SpO2   (!) 97.2 °F (36.2 °C) 63 16 96/63 99 %      Temp Source Heart Rate Source Patient Position - Orthostatic VS BP Location FiO2 (%)   Tympanic Monitor Lying Left arm --      Pain Score       --           Vitals:    09/09/24 1533 09/09/24 1534 09/09/24 2014   BP: 96/63 104/69 107/65   Pulse: 63 65 82   Patient Position - Orthostatic VS: Lying Lying Lying         Visual Acuity      ED Medications  Medications   sodium chloride 0.9 % bolus 1,000 mL (0 mL Intravenous Stopped 9/9/24 2147)       Diagnostic Studies  Results Reviewed       Procedure Component Value Units Date/Time    HS Troponin I 2hr [[id number]]  (Normal) Collected: 09/09/24 2044    Lab Status: Final result Specimen: Blood from Hand, Left Updated: 09/09/24 2130     hs TnI 2hr 7 ng/L      Delta 2hr hsTnI -4 ng/L     HS Troponin 0hr (reflex protocol) [[id number]]  (Normal) Collected: 09/09/24 1843    Lab Status: Final result Specimen: Blood from Arm, Right Updated: 09/09/24 2021     hs TnI 0hr 11  ng/L     hCG, qualitative pregnancy [[id number]]  (Normal) Collected: 09/09/24 1843    Lab Status: Final result Specimen: Blood from Arm, Right Updated: 09/09/24 1942     Preg, Serum Negative    Comprehensive metabolic panel [[id number]]  (Abnormal) Collected: 09/09/24 1843    Lab Status: Final result Specimen: Blood from Arm, Right Updated: 09/09/24 1941     Sodium 139 mmol/L      Potassium 3.5 mmol/L      Chloride 100 mmol/L      CO2 27 mmol/L      ANION GAP 12 mmol/L      BUN 11 mg/dL      Creatinine 0.70 mg/dL      Glucose 69 mg/dL      Calcium 10.2 mg/dL      AST 25 U/L      ALT 18 U/L      Alkaline Phosphatase 55 U/L      Total Protein 8.0 g/dL      Albumin 5.2 g/dL      Total Bilirubin 0.73 mg/dL      eGFR 124 ml/min/1.73sq m     Narrative:      National Kidney Disease Foundation guidelines for Chronic Kidney Disease (CKD):     Stage 1 with normal or high GFR (GFR > 90 mL/min/1.73 square meters)    Stage 2 Mild CKD (GFR = 60-89 mL/min/1.73 square meters)    Stage 3A Moderate CKD (GFR = 45-59 mL/min/1.73 square meters)    Stage 3B Moderate CKD (GFR = 30-44 mL/min/1.73 square meters)    Stage 4 Severe CKD (GFR = 15-29 mL/min/1.73 square meters)    Stage 5 End Stage CKD (GFR <15 mL/min/1.73 square meters)  Note: GFR calculation is accurate only with a steady state creatinine    D-dimer, quantitative [[id number]]  (Normal) Collected: 09/09/24 1843    Lab Status: Final result Specimen: Blood from Arm, Right Updated: 09/09/24 1922     D-Dimer, Quant 0.40 ug/ml FEU     CBC and differential [[id number]]  (Abnormal) Collected: 09/09/24 1843    Lab Status: Final result Specimen: Blood from Arm, Right Updated: 09/09/24 1856     WBC 10.24 Thousand/uL      RBC 4.70 Million/uL      Hemoglobin 14.5 g/dL      Hematocrit 44.2 %      MCV 94 fL      MCH 30.9 pg      MCHC 32.8 g/dL      RDW 12.4 %      MPV 11.1 fL      Platelets 246 Thousands/uL      nRBC 0 /100 WBCs      Segmented % 75 %      Immature Grans % 0 %      Lymphocytes %  19 %      Monocytes % 6 %      Eosinophils Relative 0 %      Basophils Relative 0 %      Absolute Neutrophils 7.54 Thousands/µL      Absolute Immature Grans 0.01 Thousand/uL      Absolute Lymphocytes 1.95 Thousands/µL      Absolute Monocytes 0.66 Thousand/µL      Eosinophils Absolute 0.04 Thousand/µL      Basophils Absolute 0.04 Thousands/µL                    XR chest 1 view   Final Result by Pal Kearney MD (09/09 2130)      No acute cardiopulmonary disease.            Workstation performed: [id number]                    Procedures  ECG 12 Lead Documentation Only    Date/Time: 9/9/2024 9:57 PM    Performed by: Florentin Ponce DO  Authorized by: Florentin Ponce DO    ECG reviewed by me, the ED Provider: yes    Patient location:  ED  Interpretation:     Interpretation: normal    Rate:     ECG rate:  64    ECG rate assessment: normal    Rhythm:     Rhythm: sinus rhythm    Ectopy:     Ectopy: none    QRS:     QRS axis:  Normal  Conduction:     Conduction: normal    ST segments:     ST segments:  Normal  T waves:     T waves: normal    Comments:      EKG does not suggest:  Ischemia   Wellens  Heart block  ARVD  HOCM  Arrhythmia  WPW  Long QT  Short QT  Brugada               ED Course  ED Course as of 09/09/24 2210   Mon Sep 09, 2024   2139 Patient reevaluated.  Resting comfortably.  No new complaints.  Discussed negative workup.  Will discharge.  Return precautions discussed.  Patient verbalized understanding and agreed to plan of care.                                 SBIRT 22yo+      Flowsheet Row Most Recent Value   Initial Alcohol Screen: US AUDIT-C     1. How often do you have a drink containing alcohol? 0 Filed at: 09/09/2024 1536   2. How many drinks containing alcohol do you have on a typical day you are drinking?  0 Filed at: 09/09/2024 1536   3a. Male UNDER 65: How often do you have five or more drinks on one occasion? 0 Filed at: 09/09/2024 1536   3b. FEMALE Any Age, or MALE 65+: How often do you have 4  "or more drinks on one occassion? 0 Filed at: 09/09/2024 1536   Audit-C Score 0 Filed at: 09/09/2024 1536   RADHA: How many times in the past year have you...    Used an illegal drug or used a prescription medication for non-medical reasons? Never Filed at: 09/09/2024 1536                      Medical Decision Making  Patient is a 19 y.o. female who presents to the ED after syncopal episode.  Patient is nontoxic, well-appearing.  Vitals are stable.  Physical exam is unremarkable.    Symptoms are consistent with syncope, most likely vasovagal. Low suspicion for orthostatic syncope given lack of dehydration and no evidence of acute life threatening hemorrhage. Presentation not consistent with seizures given short time course, no postictal state, and no seizure activity. Low suspicion for acute neurologic catastrophes including ICH given lack of trauma, or risk factors for bleeding. Low suspicion for vascular catastrophes including PE, thoracic aortic dissection, AAA rupture. Presentation not consistent with acute life threatening arrhythmia, structural heart disease, electrical conduction abnormalities, or ACS.    Plan: labs, troponin, CXR, EKG, serial reassessment                 Portions of the record may have been created with voice recognition software. Occasional wrong word or \"sound a like\" substitutions may have occurred due to the inherent limitations of voice recognition software. Read the chart carefully and recognize, using context, where substitutions have occurred    Amount and/or Complexity of Data Reviewed  Labs: ordered.  Radiology: ordered.                 Disposition  Final diagnoses:   Syncope     Time reflects when diagnosis was documented in both MDM as applicable and the Disposition within this note       Time User Action Codes Description Comment    9/9/2024  9:37 PM Florentin Ponce Add [R55] Syncope           ED Disposition       ED Disposition   Discharge    Condition   Stable    Date/Time   Mon " Sep 9, 2024 1738    Comment   Xiomara Sadler discharge to home/self care.                   Follow-up Information       Follow up With Specialties Details Why Contact Info Additional Information    Infolink  Call in 1 day  [phone]       [facility] Emergency Department Emergency Medicine   [address] [facility] [address]  [phone] [facility], [address], [place], [address]            Discharge Medication List as of 9/9/2024  9:39 PM        CONTINUE these medications which have NOT CHANGED    Details   UNKNOWN TO PATIENT daily as needed Medication for migraines - unknown medication, Historical Med             No discharge procedures on file.    PDMP Review       None            ED Provider  Electronically Signed by             Florentin Ponce DO  09/09/24 1860

## 2024-09-10 NOTE — DISCHARGE INSTRUCTIONS
As discussed please follow-up with your family doctor.  Return to the emergency room for any recurrent episodes of syncope, chest pain, shortness of breath, or for any other new or concerning symptoms  
negative

## 2024-09-12 LAB
ATRIAL RATE: 64 BPM
P AXIS: 47 DEGREES
PR INTERVAL: 146 MS
QRS AXIS: 74 DEGREES
QRSD INTERVAL: 74 MS
QT INTERVAL: 382 MS
QTC INTERVAL: 394 MS
T WAVE AXIS: 47 DEGREES
VENTRICULAR RATE: 64 BPM

## 2024-09-12 PROCEDURE — 93010 ELECTROCARDIOGRAM REPORT: CPT | Performed by: INTERNAL MEDICINE

## 2024-10-04 ENCOUNTER — HOSPITAL ENCOUNTER (EMERGENCY)
Facility: HOSPITAL | Age: 19
Discharge: HOME | End: 2024-10-04
Attending: STUDENT IN AN ORGANIZED HEALTH CARE EDUCATION/TRAINING PROGRAM | Admitting: STUDENT IN AN ORGANIZED HEALTH CARE EDUCATION/TRAINING PROGRAM
Payer: OTHER MISCELLANEOUS

## 2024-10-04 VITALS
OXYGEN SATURATION: 96 % | DIASTOLIC BLOOD PRESSURE: 77 MMHG | RESPIRATION RATE: 16 BRPM | TEMPERATURE: 98 F | WEIGHT: 99 LBS | BODY MASS INDEX: 19.33 KG/M2 | SYSTOLIC BLOOD PRESSURE: 119 MMHG | HEART RATE: 65 BPM

## 2024-10-04 DIAGNOSIS — T30.0 BURN: Primary | ICD-10-CM

## 2024-10-04 PROCEDURE — 99283 EMERGENCY DEPT VISIT LOW MDM: CPT | Performed by: STUDENT IN AN ORGANIZED HEALTH CARE EDUCATION/TRAINING PROGRAM

## 2024-10-04 PROCEDURE — 99282 EMERGENCY DEPT VISIT SF MDM: CPT

## 2024-10-04 NOTE — DISCHARGE INSTRUCTIONS
"You were evaluated in the Emergency Department today for a burn.    You may take ibuprofen every 6 hours or tylenol every 6 hours as needed for pain.    Please follow-up as soon as possible with a burn specialist.    The closest burn center is listed below:    [facility]  [address]  [address]  [place] [address] [phone]    Please also follow up with your primary care physician as soon as possible. If you do not have one, please call \"infolink\" to schedule an appointment with one.      Return to the Emergency Department if you experience worsening or spreading redness around the burn, worsening or uncontrolled pain, fevers 100.4°F or greater, recurrent vomiting, shortness of breath, or any other concerning symptoms.  "

## 2024-10-05 NOTE — ED PROVIDER NOTES
Final diagnoses:   Burn     ED Disposition       ED Disposition   Discharge    Condition   Stable    Date/Time   Fri Oct 4, 2024  4:58 PM    Comment   Xiomara Sadler discharge to home/self care.                   Assessment & Plan       Medical Decision Making  Patient is a 19 y.o. female who presents to the ED for evaluation of a burn.  Patient is nontoxic, well-appearing.     Differential includes but is not limited to: First-degree burn.  No signs or symptoms of compartment syndrome.    Plan: Reassurance, discharge with burn center follow-up as needed                            Medications - No data to display    ED Risk Strat Scores                           SBIRT 22yo+      Flowsheet Row Most Recent Value   Initial Alcohol Screen: US AUDIT-C     1. How often do you have a drink containing alcohol? 0 Filed at: 10/04/2024 1644   2. How many drinks containing alcohol do you have on a typical day you are drinking?  0 Filed at: 10/04/2024 1644   3a. Male UNDER 65: How often do you have five or more drinks on one occasion? 0 Filed at: 10/04/2024 1644   3b. FEMALE Any Age, or MALE 65+: How often do you have 4 or more drinks on one occassion? 0 Filed at: 10/04/2024 1644   Audit-C Score 0 Filed at: 10/04/2024 1644   RADHA: How many times in the past year have you...    Used an illegal drug or used a prescription medication for non-medical reasons? Never Filed at: 10/04/2024 1644                            History of Present Illness       Chief Complaint   Patient presents with    Burn     Burned R hand at work four days ago o hot water at work       Past Medical History:   Diagnosis Date    Anemia     Asthma     Migraine       History reviewed. No pertinent surgical history.   History reviewed. No pertinent family history.   Social History     Tobacco Use    Smoking status: Never    Smokeless tobacco: Never   Vaping Use    Vaping status: Never Used   Substance Use Topics    Alcohol use: Never    Drug use: Never       E-Cigarette/Vaping    E-Cigarette Use Never User       E-Cigarette/Vaping Substances      I have reviewed and agree with the history as documented.     Patient is a 19-year-old female who presents the emergency room for evaluation of right hand burn.  Patient burned her right hand on coffee this past Monday.  Initially was very painful but the pain has improved.  She was told by a nurse that she needs to get evaluated in the ED.  Denies any other injuries.  No numbness, tingling, weakness.  Able to move her hand without difficulty.  No other complaints or concerns.      Burn      Review of Systems   Skin:  Positive for color change.   All other systems reviewed and are negative.          Objective       ED Triage Vitals [10/04/24 1540]   Temperature Pulse Blood Pressure Respirations SpO2 Patient Position - Orthostatic VS   98 °F (36.7 °C) 65 119/77 16 96 % Sitting      Temp Source Heart Rate Source BP Location FiO2 (%) Pain Score    Tympanic Monitor Left arm -- 2      Vitals      Date and Time Temp Pulse SpO2 Resp BP Pain Score FACES Pain Rating User   10/04/24 1540 98 °F (36.7 °C) 65 96 % 16 119/77 2 -- LS            Physical Exam  Vitals and nursing note reviewed.   Constitutional:       General: She is not in acute distress.     Appearance: She is well-developed. She is not ill-appearing, toxic-appearing or diaphoretic.   HENT:      Head: Normocephalic and atraumatic.      Right Ear: External ear normal.      Left Ear: External ear normal.      Nose: Nose normal.   Eyes:      General: Lids are normal. No scleral icterus.  Cardiovascular:      Rate and Rhythm: Normal rate and regular rhythm.   Pulmonary:      Effort: Pulmonary effort is normal. No respiratory distress.   Musculoskeletal:         General: No deformity. Normal range of motion.        Hands:       Cervical back: Normal range of motion and neck supple.   Skin:     General: Skin is warm and dry.   Neurological:      General: No focal deficit present.       Mental Status: She is alert.   Psychiatric:         Mood and Affect: Mood normal.         Behavior: Behavior normal.         Results Reviewed       None            No orders to display       Procedures    ED Medication and Procedure Management   Prior to Admission Medications   Prescriptions Last Dose Informant Patient Reported? Taking?   UNKNOWN TO PATIENT   Yes No   Sig: daily as needed Medication for migraines - unknown medication   Patient not taking: Reported on 3/27/2024      Facility-Administered Medications: None     Discharge Medication List as of 10/4/2024  4:59 PM        CONTINUE these medications which have NOT CHANGED    Details   UNKNOWN TO PATIENT daily as needed Medication for migraines - unknown medication, Historical Med           No discharge procedures on file.  ED SEPSIS DOCUMENTATION   Time reflects when diagnosis was documented in both MDM as applicable and the Disposition within this note       Time User Action Codes Description Comment    10/4/2024  4:58 PM Florentin Ponce Add [T30.0] Burn                  Florentin Ponce, DO  10/04/24 2140